# Patient Record
Sex: MALE | Race: WHITE | NOT HISPANIC OR LATINO | Employment: OTHER | URBAN - METROPOLITAN AREA
[De-identification: names, ages, dates, MRNs, and addresses within clinical notes are randomized per-mention and may not be internally consistent; named-entity substitution may affect disease eponyms.]

---

## 2018-06-05 ENCOUNTER — HOSPITAL ENCOUNTER (EMERGENCY)
Facility: HOSPITAL | Age: 38
Discharge: HOME | End: 2018-06-06
Attending: EMERGENCY MEDICINE
Payer: MEDICAID

## 2018-06-05 DIAGNOSIS — F15.93 CAFFEINE WITHDRAWAL: ICD-10-CM

## 2018-06-05 DIAGNOSIS — F31.11 BIPOLAR 1 DISORDER, MANIC, MILD: Primary | ICD-10-CM

## 2018-06-05 DIAGNOSIS — F17.203 NICOTINE WITHDRAWAL: ICD-10-CM

## 2018-06-05 DIAGNOSIS — R07.9 CHEST PAIN: ICD-10-CM

## 2018-06-05 PROCEDURE — 99283 EMERGENCY DEPT VISIT LOW MDM: CPT

## 2018-06-05 PROCEDURE — 93010 ELECTROCARDIOGRAM REPORT: CPT | Mod: ,,, | Performed by: INTERNAL MEDICINE

## 2018-06-05 PROCEDURE — 93005 ELECTROCARDIOGRAM TRACING: CPT

## 2018-06-06 VITALS
HEART RATE: 78 BPM | WEIGHT: 211 LBS | BODY MASS INDEX: 31.98 KG/M2 | OXYGEN SATURATION: 98 % | TEMPERATURE: 98 F | HEIGHT: 68 IN | RESPIRATION RATE: 20 BRPM | DIASTOLIC BLOOD PRESSURE: 74 MMHG | SYSTOLIC BLOOD PRESSURE: 133 MMHG

## 2018-06-06 PROCEDURE — 25000003 PHARM REV CODE 250: Performed by: EMERGENCY MEDICINE

## 2018-06-06 RX ORDER — QUETIAPINE FUMARATE 50 MG/1
50 TABLET, FILM COATED ORAL NIGHTLY
Qty: 30 TABLET | Refills: 0 | Status: SHIPPED | OUTPATIENT
Start: 2018-06-06 | End: 2019-10-06

## 2018-06-06 RX ORDER — QUETIAPINE FUMARATE 25 MG/1
50 TABLET, FILM COATED ORAL
Status: COMPLETED | OUTPATIENT
Start: 2018-06-06 | End: 2018-06-06

## 2018-06-06 RX ORDER — OLANZAPINE 5 MG/1
15 TABLET, ORALLY DISINTEGRATING ORAL
Status: COMPLETED | OUTPATIENT
Start: 2018-06-06 | End: 2018-06-06

## 2018-06-06 RX ADMIN — QUETIAPINE FUMARATE 50 MG: 25 TABLET ORAL at 01:06

## 2018-06-06 RX ADMIN — OLANZAPINE 15 MG: 5 TABLET, ORALLY DISINTEGRATING ORAL at 01:06

## 2018-06-06 NOTE — ED NOTES
Follow up instructions with [facility] reviewed with pt, verbalizes understanding. Prescription reviewed with pt and wife. Pt ambulated to waiting room, steady gait, belongings in hand.
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
## 2018-06-06 NOTE — ED NOTES
"Pt presents to ER for evaluation of fatigue and unable to rest for about 2 wks. Pt states he recently decreased his caffeine and nicotine intake. Pt went from 6 Redbulls to none and about 2 packs cigarettes to under a pack a day. Pt also reports being under a significant amount of stress with work and family and has only been getting 3-4 hrs of sleep at night. Pt reports hx when he was younger of being evaluated by psychiatrist for depression, but has hx of anxiety. Pt has delayed response to questions, appears anxious when trying to answer questions and keeps reverting back to being "unable to rest". Pt also c/o right chest wall pain, tender to touch. Pt wife reports pt being confused and overly emotional. Pt is in bed, locked, lowest position, side rails up, connected to continuous NIBP, pulse ox, and heart monitor, will continue to monitor.     "

## 2019-10-06 ENCOUNTER — HOSPITAL ENCOUNTER (EMERGENCY)
Facility: HOSPITAL | Age: 39
Discharge: HOME | End: 2019-10-06
Attending: EMERGENCY MEDICINE
Payer: MEDICAID

## 2019-10-06 VITALS
SYSTOLIC BLOOD PRESSURE: 116 MMHG | WEIGHT: 210 LBS | HEIGHT: 69 IN | RESPIRATION RATE: 18 BRPM | BODY MASS INDEX: 31.1 KG/M2 | DIASTOLIC BLOOD PRESSURE: 75 MMHG | OXYGEN SATURATION: 100 % | HEART RATE: 90 BPM | TEMPERATURE: 98 F

## 2019-10-06 DIAGNOSIS — R52 PAIN: ICD-10-CM

## 2019-10-06 DIAGNOSIS — L08.9 SKIN INFECTION: Primary | ICD-10-CM

## 2019-10-06 PROBLEM — T14.8XXA WOUND INFECTION: Status: ACTIVE | Noted: 2019-10-06

## 2019-10-06 LAB
ALBUMIN SERPL BCP-MCNC: 4.5 G/DL (ref 3.5–5.2)
ALP SERPL-CCNC: 47 U/L (ref 55–135)
ALT SERPL W/O P-5'-P-CCNC: 21 U/L (ref 10–44)
ANION GAP SERPL CALC-SCNC: 5 MMOL/L (ref 8–16)
AST SERPL-CCNC: 24 U/L (ref 10–40)
BASOPHILS # BLD AUTO: 0.08 K/UL (ref 0–0.2)
BASOPHILS NFR BLD: 0.6 % (ref 0–1.9)
BILIRUB SERPL-MCNC: 0.9 MG/DL (ref 0.1–1)
BUN SERPL-MCNC: 11 MG/DL (ref 6–20)
CALCIUM SERPL-MCNC: 9.3 MG/DL (ref 8.7–10.5)
CHLORIDE SERPL-SCNC: 102 MMOL/L (ref 95–110)
CO2 SERPL-SCNC: 27 MMOL/L (ref 23–29)
CREAT SERPL-MCNC: 1 MG/DL (ref 0.5–1.4)
DIFFERENTIAL METHOD: ABNORMAL
EOSINOPHIL # BLD AUTO: 1.4 K/UL (ref 0–0.5)
EOSINOPHIL NFR BLD: 10.5 % (ref 0–8)
ERYTHROCYTE [DISTWIDTH] IN BLOOD BY AUTOMATED COUNT: 12.6 % (ref 11.5–14.5)
EST. GFR  (AFRICAN AMERICAN): >60 ML/MIN/1.73 M^2
EST. GFR  (NON AFRICAN AMERICAN): >60 ML/MIN/1.73 M^2
GLUCOSE SERPL-MCNC: 91 MG/DL (ref 70–110)
HCT VFR BLD AUTO: 45.4 % (ref 40–54)
HGB BLD-MCNC: 15.6 G/DL (ref 14–18)
IMM GRANULOCYTES # BLD AUTO: 0.04 K/UL (ref 0–0.04)
IMM GRANULOCYTES NFR BLD AUTO: 0.3 % (ref 0–0.5)
LYMPHOCYTES # BLD AUTO: 1.9 K/UL (ref 1–4.8)
LYMPHOCYTES NFR BLD: 14.3 % (ref 18–48)
MCH RBC QN AUTO: 31.2 PG (ref 27–31)
MCHC RBC AUTO-ENTMCNC: 34.4 G/DL (ref 32–36)
MCV RBC AUTO: 91 FL (ref 82–98)
MONOCYTES # BLD AUTO: 1 K/UL (ref 0.3–1)
MONOCYTES NFR BLD: 7.6 % (ref 4–15)
NEUTROPHILS # BLD AUTO: 8.7 K/UL (ref 1.8–7.7)
NEUTROPHILS NFR BLD: 66.7 % (ref 38–73)
NRBC BLD-RTO: 0 /100 WBC
PLATELET # BLD AUTO: 244 K/UL (ref 150–350)
PMV BLD AUTO: 9.5 FL (ref 9.2–12.9)
POTASSIUM SERPL-SCNC: 4.4 MMOL/L (ref 3.5–5.1)
PROT SERPL-MCNC: 7.7 G/DL (ref 6–8.4)
RBC # BLD AUTO: 5 M/UL (ref 4.6–6.2)
SODIUM SERPL-SCNC: 134 MMOL/L (ref 136–145)
WBC # BLD AUTO: 12.97 K/UL (ref 3.9–12.7)

## 2019-10-06 PROCEDURE — 80053 COMPREHEN METABOLIC PANEL: CPT

## 2019-10-06 PROCEDURE — 90471 IMMUNIZATION ADMIN: CPT | Performed by: NURSE PRACTITIONER

## 2019-10-06 PROCEDURE — 90714 TD VACC NO PRESV 7 YRS+ IM: CPT | Performed by: NURSE PRACTITIONER

## 2019-10-06 PROCEDURE — 63600175 PHARM REV CODE 636 W HCPCS: Performed by: NURSE PRACTITIONER

## 2019-10-06 PROCEDURE — 25000003 PHARM REV CODE 250: Performed by: NURSE PRACTITIONER

## 2019-10-06 PROCEDURE — 99284 EMERGENCY DEPT VISIT MOD MDM: CPT | Mod: 25

## 2019-10-06 PROCEDURE — 85025 COMPLETE CBC W/AUTO DIFF WBC: CPT

## 2019-10-06 PROCEDURE — S0077 INJECTION, CLINDAMYCIN PHOSP: HCPCS | Performed by: NURSE PRACTITIONER

## 2019-10-06 PROCEDURE — 96365 THER/PROPH/DIAG IV INF INIT: CPT

## 2019-10-06 RX ORDER — CLINDAMYCIN PHOSPHATE 900 MG/50ML
900 INJECTION, SOLUTION INTRAVENOUS
Status: COMPLETED | OUTPATIENT
Start: 2019-10-06 | End: 2019-10-06

## 2019-10-06 RX ORDER — DEXTROMETHORPHAN HYDROBROMIDE, GUAIFENESIN 5; 100 MG/5ML; MG/5ML
650 LIQUID ORAL EVERY 8 HOURS
Refills: 0 | COMMUNITY
Start: 2019-10-06

## 2019-10-06 RX ORDER — CLINDAMYCIN PHOSPHATE 150 MG/ML
900 INJECTION, SOLUTION INTRAVENOUS
Status: DISCONTINUED | OUTPATIENT
Start: 2019-10-06 | End: 2019-10-06

## 2019-10-06 RX ORDER — BUPRENORPHINE HYDROCHLORIDE AND NALOXONE HYDROCHLORIDE DIHYDRATE 2; .5 MG/1; MG/1
TABLET SUBLINGUAL EVERY 6 HOURS PRN
COMMUNITY

## 2019-10-06 RX ORDER — CLINDAMYCIN HYDROCHLORIDE 150 MG/1
300 CAPSULE ORAL 4 TIMES DAILY
Qty: 80 CAPSULE | Refills: 0 | Status: SHIPPED | OUTPATIENT
Start: 2019-10-06 | End: 2019-10-16

## 2019-10-06 RX ORDER — MUPIROCIN 20 MG/G
OINTMENT TOPICAL
Status: COMPLETED | OUTPATIENT
Start: 2019-10-06 | End: 2019-10-06

## 2019-10-06 RX ORDER — MUPIROCIN 20 MG/G
OINTMENT TOPICAL 2 TIMES DAILY
Qty: 15 G | Refills: 0 | Status: SHIPPED | OUTPATIENT
Start: 2019-10-06

## 2019-10-06 RX ORDER — NAPROXEN 250 MG/1
500 TABLET ORAL
Status: COMPLETED | OUTPATIENT
Start: 2019-10-06 | End: 2019-10-06

## 2019-10-06 RX ORDER — NAPROXEN 500 MG/1
500 TABLET ORAL 2 TIMES DAILY WITH MEALS
Qty: 30 TABLET | Refills: 0 | Status: SHIPPED | OUTPATIENT
Start: 2019-10-06

## 2019-10-06 RX ADMIN — NAPROXEN 500 MG: 250 TABLET ORAL at 01:10

## 2019-10-06 RX ADMIN — CLINDAMYCIN IN 5 PERCENT DEXTROSE 900 MG: 18 INJECTION, SOLUTION INTRAVENOUS at 01:10

## 2019-10-06 RX ADMIN — SODIUM CHLORIDE 1000 ML: 0.9 INJECTION, SOLUTION INTRAVENOUS at 02:10

## 2019-10-06 RX ADMIN — MUPIROCIN: 20 OINTMENT TOPICAL at 01:10

## 2019-10-06 RX ADMIN — TETANUS AND DIPHTHERIA TOXOIDS ADSORBED 0.5 ML: 2; 2 INJECTION INTRAMUSCULAR at 01:10
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
## 2025-02-17 ENCOUNTER — OCCUPATIONAL HEALTH (OUTPATIENT)
Dept: URGENT CARE | Facility: CLINIC | Age: 45
End: 2025-02-17

## 2025-02-17 DIAGNOSIS — Z02.83 ENCOUNTER FOR DRUG SCREENING: Primary | ICD-10-CM

## 2025-06-30 ENCOUNTER — HOSPITAL ENCOUNTER (EMERGENCY)
Facility: HOSPITAL | Age: 45
Discharge: HOME | End: 2025-06-30
Attending: STUDENT IN AN ORGANIZED HEALTH CARE EDUCATION/TRAINING PROGRAM

## 2025-06-30 VITALS
DIASTOLIC BLOOD PRESSURE: 76 MMHG | OXYGEN SATURATION: 99 % | BODY MASS INDEX: 21.92 KG/M2 | TEMPERATURE: 98 F | WEIGHT: 148 LBS | RESPIRATION RATE: 18 BRPM | HEIGHT: 69 IN | HEART RATE: 77 BPM | SYSTOLIC BLOOD PRESSURE: 134 MMHG

## 2025-06-30 DIAGNOSIS — M71.21 SYNOVIAL CYST OF RIGHT POPLITEAL SPACE: Primary | ICD-10-CM

## 2025-06-30 DIAGNOSIS — M79.606 LEG PAIN: ICD-10-CM

## 2025-06-30 PROCEDURE — 99284 EMERGENCY DEPT VISIT MOD MDM: CPT | Mod: 25

## 2025-06-30 RX ORDER — KETOROLAC TROMETHAMINE 10 MG/1
10 TABLET, FILM COATED ORAL EVERY 6 HOURS
Qty: 20 TABLET | Refills: 0 | Status: SHIPPED | OUTPATIENT
Start: 2025-06-30 | End: 2025-07-05

## 2025-06-30 NOTE — DISCHARGE INSTRUCTIONS
Please return to the ED if you have new or worsening symptoms.  Follow up with the Orthopedic surgery for further management of your Baker cyst.
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
## 2025-07-03 ENCOUNTER — TELEPHONE (OUTPATIENT)
Dept: ORTHOPEDICS | Facility: CLINIC | Age: 45
End: 2025-07-03